# Patient Record
(demographics unavailable — no encounter records)

---

## 2024-12-17 NOTE — ASSESSMENT
[FreeTextEntry1] : follow up  PMHx - HLD, prediabetes  no complaints  needs refill of statin  Blood work drawn in office today

## 2024-12-17 NOTE — HEALTH RISK ASSESSMENT
[Yes] : Yes [2 - 4 times a month (2 pts)] : 2-4 times a month (2 points) [1 or 2 (0 pts)] : 1 or 2 (0 points) [Never (0 pts)] : Never (0 points) [No] : In the past 12 months have you used drugs other than those required for medical reasons? No [No falls in past year] : Patient reported no falls in the past year [0] : 2) Feeling down, depressed, or hopeless: Not at all (0) [PHQ-2 Negative - No further assessment needed] : PHQ-2 Negative - No further assessment needed [de-identified] : No [de-identified] : GI Dr. Kaba, Colonoscopy was done  [Audit-CScore] : 2 [de-identified] : Active during the day  [de-identified] : Healthy [[id number]] : 0 [Never] : Never [de-identified] : No

## 2024-12-17 NOTE — HISTORY OF PRESENT ILLNESS
[FreeTextEntry1] : follow up [de-identified] : follow up  PMHx - HLD, prediabetes  no complaints  needs refill of statin